# Patient Record
Sex: MALE
[De-identification: names, ages, dates, MRNs, and addresses within clinical notes are randomized per-mention and may not be internally consistent; named-entity substitution may affect disease eponyms.]

---

## 2020-06-03 ENCOUNTER — APPOINTMENT (OUTPATIENT)
Dept: INTERNAL MEDICINE | Facility: CLINIC | Age: 20
End: 2020-06-03
Payer: COMMERCIAL

## 2020-06-03 VITALS
HEIGHT: 70 IN | WEIGHT: 160 LBS | DIASTOLIC BLOOD PRESSURE: 90 MMHG | SYSTOLIC BLOOD PRESSURE: 138 MMHG | BODY MASS INDEX: 22.9 KG/M2 | TEMPERATURE: 99.2 F | HEART RATE: 132 BPM | OXYGEN SATURATION: 97 %

## 2020-06-03 DIAGNOSIS — R21 RASH AND OTHER NONSPECIFIC SKIN ERUPTION: ICD-10-CM

## 2020-06-03 DIAGNOSIS — Z11.3 ENCOUNTER FOR SCREENING FOR INFECTIONS WITH A PREDOMINANTLY SEXUAL MODE OF TRANSMISSION: ICD-10-CM

## 2020-06-03 DIAGNOSIS — Z78.9 OTHER SPECIFIED HEALTH STATUS: ICD-10-CM

## 2020-06-03 DIAGNOSIS — Z87.39 PERSONAL HISTORY OF OTHER DISEASES OF THE MUSCULOSKELETAL SYSTEM AND CONNECTIVE TISSUE: ICD-10-CM

## 2020-06-03 PROCEDURE — 99385 PREV VISIT NEW AGE 18-39: CPT

## 2020-06-03 PROCEDURE — 36415 COLL VENOUS BLD VENIPUNCTURE: CPT

## 2020-06-03 RX ORDER — CLOTRIMAZOLE 10 MG/G
1 CREAM TOPICAL 3 TIMES DAILY
Qty: 1 | Refills: 3 | Status: ACTIVE | COMMUNITY
Start: 2020-06-03 | End: 1900-01-01

## 2020-06-03 NOTE — PHYSICAL EXAM
[No Edema] : there was no peripheral edema [Normal Posterior Cervical Nodes] : no posterior cervical lymphadenopathy [Normal Anterior Cervical Nodes] : no anterior cervical lymphadenopathy [Normal] : affect was normal and insight and judgment were intact [FreeTextEntry1] : normal recal exam externally,   slight hyperpigmentation around scrotum

## 2020-06-03 NOTE — HISTORY OF PRESENT ILLNESS
[FreeTextEntry1] : wellness [de-identified] : \tristian Here for wellness.  \tristian Has some itching in his groin - for the past month.  Has not had this before.  Nothing new in lifestyle.\par Hx of scoliosis with fusion in High school. - no issues post surgery\tristian Walks for activity. \tristian Has noticed the amount of ejaculation has decreased.  He is also concerned about color.  \par Can have some discomfort with ejaculation.  No issue with urination

## 2020-06-03 NOTE — PLAN
[FreeTextEntry1] : Wellness complete\par labs today\par Check sti, cannot do GC due to Covid and lack of clean bathroom\par Trial of clotriamzole for suspected groin fungal rash\par Reassurance given that changes in ejaculate amount is not unusual.\par Offered  evaluation as well...he will consider

## 2021-09-21 ENCOUNTER — EMERGENCY (EMERGENCY)
Facility: HOSPITAL | Age: 21
LOS: 1 days | Discharge: HOME | End: 2021-09-21
Admitting: EMERGENCY MEDICINE
Payer: COMMERCIAL

## 2021-09-21 ENCOUNTER — APPOINTMENT (OUTPATIENT)
Dept: INTERNAL MEDICINE | Facility: CLINIC | Age: 21
End: 2021-09-21
Payer: COMMERCIAL

## 2021-09-21 VITALS
HEART RATE: 109 BPM | BODY MASS INDEX: 22.9 KG/M2 | SYSTOLIC BLOOD PRESSURE: 140 MMHG | HEIGHT: 70 IN | WEIGHT: 160 LBS | TEMPERATURE: 97.8 F | DIASTOLIC BLOOD PRESSURE: 88 MMHG | OXYGEN SATURATION: 98 %

## 2021-09-21 VITALS
RESPIRATION RATE: 18 BRPM | HEART RATE: 112 BPM | HEIGHT: 70 IN | DIASTOLIC BLOOD PRESSURE: 81 MMHG | WEIGHT: 160.06 LBS | TEMPERATURE: 99 F | SYSTOLIC BLOOD PRESSURE: 162 MMHG | OXYGEN SATURATION: 99 %

## 2021-09-21 DIAGNOSIS — Z48.02 ENCOUNTER FOR REMOVAL OF SUTURES: ICD-10-CM

## 2021-09-21 DIAGNOSIS — Z00.00 ENCOUNTER FOR GENERAL ADULT MEDICAL EXAMINATION W/OUT ABNORMAL FINDINGS: ICD-10-CM

## 2021-09-21 PROCEDURE — 99281 EMR DPT VST MAYX REQ PHY/QHP: CPT

## 2021-09-21 PROCEDURE — 99212 OFFICE O/P EST SF 10 MIN: CPT

## 2021-09-21 PROCEDURE — 99395 PREV VISIT EST AGE 18-39: CPT

## 2021-09-21 PROCEDURE — 36415 COLL VENOUS BLD VENIPUNCTURE: CPT

## 2021-09-21 RX ORDER — EPINEPHRINE 0.3 MG/.3ML
0.3 INJECTION INTRAMUSCULAR
Qty: 2 | Refills: 0 | Status: ACTIVE | COMMUNITY
Start: 2021-09-21 | End: 1900-01-01

## 2021-09-21 NOTE — ED PROVIDER NOTE - OBJECTIVE STATEMENT
22 yo M denying PMHx here s/p fall 5 days ago for suture removal. Reports hit chin on ground and had 3 sutures placed at an outside ED in [place]. States had some drainage from the area the first day however denying any since. Denying pain, bleeding, fevers/chills, rashes/lesions, other injury. 20 yo M denying PMHx s/p fall 5 days ago here for suture removal. Reports hit chin on ground and had 3 sutures placed at an outside ED in [place]. States had some drainage from the area the first day however denying any since. Denying pain, bleeding, fevers/chills, rashes/lesions, other injury.

## 2021-09-21 NOTE — PLAN
[FreeTextEntry1] : wellness complete\par \par sti panel today\par unable to get stiches from chin removed - will send to [facility] ed - called and notified of his coming\par \par /up anually

## 2021-09-21 NOTE — ED PROVIDER NOTE - CLINICAL SUMMARY MEDICAL DECISION MAKING FREE TEXT BOX
22 yo M denying PMHx s/p fall 5 days ago here for suture removal.  VSS, afebrile, well-appearing.  Well-healing chin laceration, sutures in place.    All 3 sutures removed without complication.  No wound dehiscence.  Stable for D/C home.  Advised F/U with PCP.

## 2021-09-21 NOTE — HISTORY OF PRESENT ILLNESS
[FreeTextEntry1] : wellness, s/p allergic reaction [de-identified] : last week s/p allergic reaction\par hit his head and fell\par brazilian nut -  - hives, fever, fainted - fell.

## 2021-09-21 NOTE — ED PROCEDURE NOTE - CPROC ED POST PROC CARE GUIDE1
Approximately 25 mls of brown aspirate from HAILEY drain site right transgluteal    
Verbal/written post procedure instructions were given to patient/caregiver./Instructed patient/caregiver to follow-up with primary care physician./Instructed patient/caregiver regarding signs and symptoms of infection./Keep the cast/splint/dressing clean and dry.

## 2021-09-21 NOTE — ED PROVIDER NOTE - PATIENT PORTAL LINK FT
You can access the FollowMyHealth Patient Portal offered by [facility] by registering at the following website: [email or web address]. By joining [employer]’s FollowMyHealth portal, you will also be able to view your health information using other applications (apps) compatible with our system.

## 2021-09-21 NOTE — PHYSICAL EXAM
[No Edema] : there was no peripheral edema [Normal Posterior Cervical Nodes] : no posterior cervical lymphadenopathy [Normal Anterior Cervical Nodes] : no anterior cervical lymphadenopathy [Normal] : affect was normal and insight and judgment were intact [FreeTextEntry1] : normal recal exam externally,   slight hyperpigmentation around scrotum [de-identified] : scabb bottom of chin with 3 stitches in place

## 2021-09-22 LAB
C TRACH RRNA SPEC QL NAA+PROBE: NOT DETECTED
HIV1+2 AB SPEC QL IA.RAPID: NONREACTIVE
N GONORRHOEA RRNA SPEC QL NAA+PROBE: NOT DETECTED
SOURCE AMPLIFICATION: NORMAL
T PALLIDUM AB SER QL IA: NEGATIVE

## 2022-06-08 PROBLEM — Z00.00 ENCOUNTER FOR PREVENTIVE HEALTH EXAMINATION: Status: ACTIVE | Noted: 2022-06-08

## 2022-07-26 ENCOUNTER — APPOINTMENT (OUTPATIENT)
Dept: INTERNAL MEDICINE | Facility: CLINIC | Age: 22
End: 2022-07-26

## 2023-04-14 NOTE — ED PROVIDER NOTE - SKIN WOUND TYPE
Noted with chronic leukocytosis  Chronically on steroids as an outpatient  Likely multifactorial in settings of being on steroids and malignant melanoma    And now with new found acute diverticulitis  Antibiotics started as above Well-approximated healing laceration to chin with 3 sutures in place. No bleeding/drainage. No surrounding skin changes.